# Patient Record
Sex: MALE | Race: WHITE | URBAN - METROPOLITAN AREA
[De-identification: names, ages, dates, MRNs, and addresses within clinical notes are randomized per-mention and may not be internally consistent; named-entity substitution may affect disease eponyms.]

---

## 2020-08-30 ENCOUNTER — NURSE TRIAGE (OUTPATIENT)
Dept: NURSING | Facility: CLINIC | Age: 35
End: 2020-08-30

## 2020-08-30 NOTE — TELEPHONE ENCOUNTER
Clinic Action Needed:No  Reason for Call: Wilmer was seen 10 days ago by a general surgeon at [facility] for an infected sebaceous cyst.  He has been on Septra DS for 10 days.  He is reporting that the area is very large and quite red, no improvement, has gotten worse.    Attempted to connect Wilmer with page  for the [facility] to speak to on call provider for General Surgery.  If he is unsuccessful reaching anyone, he should go to ER today to be seen in person.    Wilmer appears to understand directives and agrees with plan.    Routed to: Not routed.    Cristela Ramon, RN  [facility]